# Patient Record
Sex: MALE | Race: WHITE | HISPANIC OR LATINO | Employment: FULL TIME | URBAN - METROPOLITAN AREA
[De-identification: names, ages, dates, MRNs, and addresses within clinical notes are randomized per-mention and may not be internally consistent; named-entity substitution may affect disease eponyms.]

---
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
## 2024-09-13 DIAGNOSIS — Z00.00 ANNUAL PHYSICAL EXAM: Primary | ICD-10-CM

## 2024-09-14 ENCOUNTER — LAB VISIT (OUTPATIENT)
Dept: LAB | Facility: HOSPITAL | Age: 19
End: 2024-09-14
Attending: STUDENT IN AN ORGANIZED HEALTH CARE EDUCATION/TRAINING PROGRAM
Payer: COMMERCIAL

## 2024-09-14 DIAGNOSIS — Z00.00 ANNUAL PHYSICAL EXAM: ICD-10-CM

## 2024-09-14 LAB
ALBUMIN SERPL BCP-MCNC: 4.2 G/DL (ref 3.5–5.2)
ALP SERPL-CCNC: 94 U/L (ref 55–135)
ALT SERPL W/O P-5'-P-CCNC: 19 U/L (ref 10–44)
ANION GAP SERPL CALC-SCNC: 7 MMOL/L (ref 8–16)
AST SERPL-CCNC: 20 U/L (ref 10–40)
BASOPHILS # BLD AUTO: 0.05 K/UL (ref 0–0.2)
BASOPHILS NFR BLD: 0.7 % (ref 0–1.9)
BILIRUB SERPL-MCNC: 1.7 MG/DL (ref 0.1–1)
BUN SERPL-MCNC: 11 MG/DL (ref 6–20)
CALCIUM SERPL-MCNC: 9.7 MG/DL (ref 8.7–10.5)
CHLORIDE SERPL-SCNC: 106 MMOL/L (ref 95–110)
CHOLEST SERPL-MCNC: 129 MG/DL (ref 120–199)
CHOLEST/HDLC SERPL: 3.8 {RATIO} (ref 2–5)
CO2 SERPL-SCNC: 26 MMOL/L (ref 23–29)
CREAT SERPL-MCNC: 0.8 MG/DL (ref 0.5–1.4)
DIFFERENTIAL METHOD BLD: NORMAL
EOSINOPHIL # BLD AUTO: 0.2 K/UL (ref 0–0.5)
EOSINOPHIL NFR BLD: 2.3 % (ref 0–8)
ERYTHROCYTE [DISTWIDTH] IN BLOOD BY AUTOMATED COUNT: 13 % (ref 11.5–14.5)
EST. GFR  (NO RACE VARIABLE): >60 ML/MIN/1.73 M^2
ESTIMATED AVG GLUCOSE: 97 MG/DL (ref 68–131)
GLUCOSE SERPL-MCNC: 85 MG/DL (ref 70–110)
HBA1C MFR BLD: 5 % (ref 4–5.6)
HCT VFR BLD AUTO: 46.2 % (ref 40–54)
HCV AB SERPL QL IA: NORMAL
HDLC SERPL-MCNC: 34 MG/DL (ref 40–75)
HDLC SERPL: 26.4 % (ref 20–50)
HGB BLD-MCNC: 15.4 G/DL (ref 14–18)
HIV 1+2 AB+HIV1 P24 AG SERPL QL IA: NORMAL
IMM GRANULOCYTES # BLD AUTO: 0.02 K/UL (ref 0–0.04)
IMM GRANULOCYTES NFR BLD AUTO: 0.3 % (ref 0–0.5)
LDLC SERPL CALC-MCNC: 78.8 MG/DL (ref 63–159)
LYMPHOCYTES # BLD AUTO: 2.2 K/UL (ref 1–4.8)
LYMPHOCYTES NFR BLD: 31.6 % (ref 18–48)
MCH RBC QN AUTO: 27.5 PG (ref 27–31)
MCHC RBC AUTO-ENTMCNC: 33.3 G/DL (ref 32–36)
MCV RBC AUTO: 82 FL (ref 82–98)
MONOCYTES # BLD AUTO: 0.6 K/UL (ref 0.3–1)
MONOCYTES NFR BLD: 8.7 % (ref 4–15)
NEUTROPHILS # BLD AUTO: 3.9 K/UL (ref 1.8–7.7)
NEUTROPHILS NFR BLD: 56.4 % (ref 38–73)
NONHDLC SERPL-MCNC: 95 MG/DL
NRBC BLD-RTO: 0 /100 WBC
PLATELET # BLD AUTO: 261 K/UL (ref 150–450)
PMV BLD AUTO: 10.2 FL (ref 9.2–12.9)
POTASSIUM SERPL-SCNC: 3.8 MMOL/L (ref 3.5–5.1)
PROT SERPL-MCNC: 7.4 G/DL (ref 6–8.4)
RBC # BLD AUTO: 5.61 M/UL (ref 4.6–6.2)
SODIUM SERPL-SCNC: 139 MMOL/L (ref 136–145)
TRIGL SERPL-MCNC: 81 MG/DL (ref 30–150)
WBC # BLD AUTO: 6.99 K/UL (ref 3.9–12.7)

## 2024-09-14 PROCEDURE — 80053 COMPREHEN METABOLIC PANEL: CPT | Performed by: STUDENT IN AN ORGANIZED HEALTH CARE EDUCATION/TRAINING PROGRAM

## 2024-09-14 PROCEDURE — 85025 COMPLETE CBC W/AUTO DIFF WBC: CPT | Performed by: STUDENT IN AN ORGANIZED HEALTH CARE EDUCATION/TRAINING PROGRAM

## 2024-09-14 PROCEDURE — 87389 HIV-1 AG W/HIV-1&-2 AB AG IA: CPT | Performed by: STUDENT IN AN ORGANIZED HEALTH CARE EDUCATION/TRAINING PROGRAM

## 2024-09-14 PROCEDURE — 86803 HEPATITIS C AB TEST: CPT | Performed by: STUDENT IN AN ORGANIZED HEALTH CARE EDUCATION/TRAINING PROGRAM

## 2024-09-14 PROCEDURE — 83036 HEMOGLOBIN GLYCOSYLATED A1C: CPT | Performed by: STUDENT IN AN ORGANIZED HEALTH CARE EDUCATION/TRAINING PROGRAM

## 2024-09-14 PROCEDURE — 80061 LIPID PANEL: CPT | Performed by: STUDENT IN AN ORGANIZED HEALTH CARE EDUCATION/TRAINING PROGRAM

## 2024-09-14 PROCEDURE — 36415 COLL VENOUS BLD VENIPUNCTURE: CPT | Performed by: STUDENT IN AN ORGANIZED HEALTH CARE EDUCATION/TRAINING PROGRAM

## 2024-09-16 ENCOUNTER — OFFICE VISIT (OUTPATIENT)
Dept: INTERNAL MEDICINE | Facility: CLINIC | Age: 19
End: 2024-09-16
Payer: COMMERCIAL

## 2024-09-16 VITALS
SYSTOLIC BLOOD PRESSURE: 112 MMHG | BODY MASS INDEX: 29.89 KG/M2 | DIASTOLIC BLOOD PRESSURE: 71 MMHG | HEART RATE: 62 BPM | WEIGHT: 175.06 LBS | HEIGHT: 64 IN

## 2024-09-16 DIAGNOSIS — R17 ELEVATED BILIRUBIN: ICD-10-CM

## 2024-09-16 DIAGNOSIS — E66.09 CLASS 1 OBESITY DUE TO EXCESS CALORIES WITHOUT SERIOUS COMORBIDITY WITH BODY MASS INDEX (BMI) OF 30.0 TO 30.9 IN ADULT: ICD-10-CM

## 2024-09-16 DIAGNOSIS — Z00.00 ANNUAL PHYSICAL EXAM: Primary | ICD-10-CM

## 2024-09-16 DIAGNOSIS — Z00.00 HEALTHCARE MAINTENANCE: ICD-10-CM

## 2024-09-16 DIAGNOSIS — Z70.9 SEXUAL COUNSELING: ICD-10-CM

## 2024-09-16 PROBLEM — E66.811 CLASS 1 OBESITY DUE TO EXCESS CALORIES WITHOUT SERIOUS COMORBIDITY WITH BODY MASS INDEX (BMI) OF 30.0 TO 30.9 IN ADULT: Status: ACTIVE | Noted: 2024-09-16

## 2024-09-16 PROCEDURE — 3074F SYST BP LT 130 MM HG: CPT | Mod: CPTII,S$GLB,, | Performed by: STUDENT IN AN ORGANIZED HEALTH CARE EDUCATION/TRAINING PROGRAM

## 2024-09-16 PROCEDURE — 3044F HG A1C LEVEL LT 7.0%: CPT | Mod: CPTII,S$GLB,, | Performed by: STUDENT IN AN ORGANIZED HEALTH CARE EDUCATION/TRAINING PROGRAM

## 2024-09-16 PROCEDURE — 3078F DIAST BP <80 MM HG: CPT | Mod: CPTII,S$GLB,, | Performed by: STUDENT IN AN ORGANIZED HEALTH CARE EDUCATION/TRAINING PROGRAM

## 2024-09-16 PROCEDURE — 3008F BODY MASS INDEX DOCD: CPT | Mod: CPTII,S$GLB,, | Performed by: STUDENT IN AN ORGANIZED HEALTH CARE EDUCATION/TRAINING PROGRAM

## 2024-09-16 PROCEDURE — 1160F RVW MEDS BY RX/DR IN RCRD: CPT | Mod: CPTII,S$GLB,, | Performed by: STUDENT IN AN ORGANIZED HEALTH CARE EDUCATION/TRAINING PROGRAM

## 2024-09-16 PROCEDURE — 99999 PR PBB SHADOW E&M-EST. PATIENT-LVL III: CPT | Mod: PBBFAC,,, | Performed by: STUDENT IN AN ORGANIZED HEALTH CARE EDUCATION/TRAINING PROGRAM

## 2024-09-16 PROCEDURE — 1159F MED LIST DOCD IN RCRD: CPT | Mod: CPTII,S$GLB,, | Performed by: STUDENT IN AN ORGANIZED HEALTH CARE EDUCATION/TRAINING PROGRAM

## 2024-09-16 PROCEDURE — 99385 PREV VISIT NEW AGE 18-39: CPT | Mod: S$GLB,,, | Performed by: STUDENT IN AN ORGANIZED HEALTH CARE EDUCATION/TRAINING PROGRAM

## 2024-09-16 RX ORDER — AZELASTINE 1 MG/ML
1 SPRAY, METERED NASAL
COMMUNITY
Start: 2024-02-09 | End: 2025-02-08

## 2024-09-16 NOTE — PROGRESS NOTES
"Subjective:       Patient ID: Mahad Conner is a 19 y.o. male.    Chief Complaint: Annual Exam (Physical because he is getting )    HPI    Mahad Conner is a 19 y.o. 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      [Local Patient]  Originally from [facility]  Lives in: [place] [address]       Patient comes to the clinic a general medical health examination and to establish care.    Patient's main concern is that he noticed a "pimple" in the bottom side of his penis, non-tender. Patient states this has been present for a few weeks and it is starting to "disappear". He says he has never had sexual intercourse.    He also wants counseling about having sexual intercourse, safe sex practices. He will be getting  this following weekend.    Patient denies CV symptoms, CP, SOB, palpitations.  Patient denies constitutional symptoms, fever, changes in the urine or stool.  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    Allergies:   Review of patient's allergies indicates:  No Known Allergies      Current Outpatient Medications:     azelastine (ASTELIN) 137 mcg (0.1 %) nasal spray, 1 spray by Nasal route., Disp: , Rfl:     Social history:  Single but will be getting  this weekend  Works for an [employer] company    Exercise:   Physically active at work    Diet:   Regular with no restrictions    Tobacco use: Denies    Tobacco Use: Low Risk  (9/16/2024)    Patient History     Smoking Tobacco Use: Never     Smokeless Tobacco Use: Never     Passive Exposure: Not on file        Alcohol use: Denies    Recreational drug use: Denies    Recent travel: Denies      Most recent laboratories " "reviewed:    Recent Labs   Lab 09/14/24  0806   WBC 6.99   Hemoglobin 15.4   Hematocrit 46.2   MCV 82   Platelets 261       Recent Labs   Lab 05/31/22  1110 09/14/24  0806   Glucose  --  85   Sodium 140 139   Potassium 4.2 3.8   BUN 8.0 11   Creatinine 0.70 0.8   Total Bilirubin 1.2 1.7 H   AST 47 20    H 19       Recent Labs   Lab 09/14/24  0806   Hemoglobin A1C 5.0       Recent Labs   Lab 09/14/24  0806   Cholesterol 129   Triglycerides 81   HDL 34 L   LDL Cholesterol 78.8   Non-HDL Cholesterol 95             Recent Labs   Lab 09/14/24  0806   HIV 1/2 Ag/Ab Non-reactive   Hepatitis C Ab Non-reactive         Latest ECG results:  No results found for this or any previous visit.    Healthcare Maintenance:  Colon cancer screening:       Vaccinations:        Tetanus - 2015       Shingles - no       Influenza - no       Pneumonia - no       COVID - completed x  6    Depression screening: PHQ2 score = 0    Annual visit approx. Month: SEPTEMBER ROS 11-point review of systems done. Negative except for detailed in the HPI.          Objective:      /71   Pulse 62   Ht 5' 4" (1.626 m)   Wt 79.4 kg (175 lb 0.7 oz)   BMI 30.05 kg/m²      Physical Exam   General appearance: Good general aspect, NAD, conversant   Chaperone present  Eyes and HEENT: Normal sclerae, moist mucous membranes, no thyromegaly or lymphadenopathy  Respiratory: No work of breathing, clear to auscultation bilaterally. No rales, rhonchi, wheezing, or rubs.  Cardiovascular: PMI not displaced. RRR. Normal S1, S2. No murmurs, rubs or gallops.  Abdomen and : Soft, non-tender, nondistended, BS, no hepatosplenomegaly, no masses.  External genitalia WNL, no masses observed, no lymphadenopathies, no discharge.  Extremities: no edemas, no extremity lymphadenopathy, no clubbing, no cyanosis.  Nervous System: Alert and oriented x 3, good concentration, no deficits.  Skin: Normal temperature, turgor and texture; no rash, ulcers or subcutaneous " nodules  Psych: Appropriate affect, alert and oriented to person, place and time          Assessment:       1. Annual physical exam  Assessment & Plan:  Patient is in overall good general health.  Stable medical conditions listed on the PMH.  Labs reviewed and patient notified.      Orders:  -     CBC Auto Differential; Future; Expected date: 09/06/2025  -     Comprehensive Metabolic Panel; Future; Expected date: 09/06/2025  -     Hemoglobin A1C; Future; Expected date: 09/06/2025    2. Class 1 obesity due to excess calories without serious comorbidity with body mass index (BMI) of 30.0 to 30.9 in adult  Assessment & Plan:  Today's weight: 79.4 kg (175 lb 0.7 oz)  Today's BMI: Body mass index is 30.05 kg/m².  Wt Readings from Last 3 Encounters:   09/16/24 79.4 kg (175 lb 0.7 oz)   12/24/18 65.8 kg (145 lb)     Physical activity and weight loss recommended          3. Elevated bilirubin  Assessment & Plan:  Lab Results   Component Value Date    ALT 19 09/14/2024    AST 20 09/14/2024    ALKPHOS 94 09/14/2024    BILITOT 1.7 (H) 09/14/2024       Asymptomatic  Likely Gilbert's. Will observe for now        4. Sexual counseling  Assessment & Plan:  Patient was counseled about safe sex practices and healthy sexual life, as per patient's request.      5. Healthcare maintenance  Assessment & Plan:  Health care maintenance and core gaps reviewed and assessed with patient.    Vaccinations recommended:        Tetanus - 2015       Shingles - n/a       Influenza - n/a       Pneumonia - n/a    Colon cancer screening:   n/a       Lifestyle recommendations given.  Physical activity recommended.    Legend: Ordered (O), Declined (D), Current (C)           Plan:       Counseling provided.      I will assume as PCP.          Patient Instructions   Try to get physically active           Problem list updated  Medications reconciled  Education provided  Lifestyle recommendations given  AVS generated, explained, and sent to the patient.  RTC  in : 1 year for annual wellness visit, labs ordered; CBC, CMP, A1C          LIDIA SPARROW MD, MPH  Internal Medicine  [facility]  [facility]

## 2024-09-16 NOTE — ASSESSMENT & PLAN NOTE
Today's weight: 79.4 kg (175 lb 0.7 oz)  Today's BMI: Body mass index is 30.05 kg/m².  Wt Readings from Last 3 Encounters:   09/16/24 79.4 kg (175 lb 0.7 oz)   12/24/18 65.8 kg (145 lb)     Physical activity and weight loss recommended

## 2024-09-16 NOTE — ASSESSMENT & PLAN NOTE
Health care maintenance and core gaps reviewed and assessed with patient.    Vaccinations recommended:        Tetanus - 2015       Shingles - n/a       Influenza - n/a       Pneumonia - n/a    Colon cancer screening:   n/a       Lifestyle recommendations given.  Physical activity recommended.    Legend: Ordered (O), Declined (D), Current (C)

## 2024-09-16 NOTE — ASSESSMENT & PLAN NOTE
Lab Results   Component Value Date    ALT 19 09/14/2024    AST 20 09/14/2024    ALKPHOS 94 09/14/2024    BILITOT 1.7 (H) 09/14/2024       Asymptomatic  Likely Gilbert's. Will observe for now

## 2025-01-13 ENCOUNTER — OFFICE VISIT (OUTPATIENT)
Dept: INTERNAL MEDICINE | Facility: CLINIC | Age: 20
End: 2025-01-13
Payer: COMMERCIAL

## 2025-01-13 ENCOUNTER — HOSPITAL ENCOUNTER (OUTPATIENT)
Dept: RADIOLOGY | Facility: HOSPITAL | Age: 20
Discharge: HOME | End: 2025-01-13
Attending: STUDENT IN AN ORGANIZED HEALTH CARE EDUCATION/TRAINING PROGRAM
Payer: COMMERCIAL

## 2025-01-13 VITALS
HEIGHT: 64 IN | DIASTOLIC BLOOD PRESSURE: 65 MMHG | SYSTOLIC BLOOD PRESSURE: 116 MMHG | BODY MASS INDEX: 31.09 KG/M2 | HEART RATE: 87 BPM | WEIGHT: 182.13 LBS

## 2025-01-13 DIAGNOSIS — M54.50 ACUTE MIDLINE LOW BACK PAIN WITHOUT SCIATICA: ICD-10-CM

## 2025-01-13 DIAGNOSIS — M54.50 ACUTE MIDLINE LOW BACK PAIN WITHOUT SCIATICA: Primary | ICD-10-CM

## 2025-01-13 PROCEDURE — 3074F SYST BP LT 130 MM HG: CPT | Mod: CPTII,S$GLB,, | Performed by: STUDENT IN AN ORGANIZED HEALTH CARE EDUCATION/TRAINING PROGRAM

## 2025-01-13 PROCEDURE — 1159F MED LIST DOCD IN RCRD: CPT | Mod: CPTII,S$GLB,, | Performed by: STUDENT IN AN ORGANIZED HEALTH CARE EDUCATION/TRAINING PROGRAM

## 2025-01-13 PROCEDURE — 72110 X-RAY EXAM L-2 SPINE 4/>VWS: CPT | Mod: 26,,, | Performed by: INTERNAL MEDICINE

## 2025-01-13 PROCEDURE — 99999 PR PBB SHADOW E&M-EST. PATIENT-LVL III: CPT | Mod: PBBFAC,,, | Performed by: STUDENT IN AN ORGANIZED HEALTH CARE EDUCATION/TRAINING PROGRAM

## 2025-01-13 PROCEDURE — 3078F DIAST BP <80 MM HG: CPT | Mod: CPTII,S$GLB,, | Performed by: STUDENT IN AN ORGANIZED HEALTH CARE EDUCATION/TRAINING PROGRAM

## 2025-01-13 PROCEDURE — 72110 X-RAY EXAM L-2 SPINE 4/>VWS: CPT | Mod: TC

## 2025-01-13 PROCEDURE — 1160F RVW MEDS BY RX/DR IN RCRD: CPT | Mod: CPTII,S$GLB,, | Performed by: STUDENT IN AN ORGANIZED HEALTH CARE EDUCATION/TRAINING PROGRAM

## 2025-01-13 PROCEDURE — 99213 OFFICE O/P EST LOW 20 MIN: CPT | Mod: S$GLB,,, | Performed by: STUDENT IN AN ORGANIZED HEALTH CARE EDUCATION/TRAINING PROGRAM

## 2025-01-13 PROCEDURE — 3008F BODY MASS INDEX DOCD: CPT | Mod: CPTII,S$GLB,, | Performed by: STUDENT IN AN ORGANIZED HEALTH CARE EDUCATION/TRAINING PROGRAM

## 2025-01-13 RX ORDER — METHOCARBAMOL 500 MG/1
500 TABLET, FILM COATED ORAL 3 TIMES DAILY PRN
Qty: 30 TABLET | Refills: 0 | Status: SHIPPED | OUTPATIENT
Start: 2025-01-13 | End: 2025-01-23

## 2025-01-13 NOTE — ASSESSMENT & PLAN NOTE
Acute, moderate in intensity, accompanied with muscle spasm.  I will treat with NSAIDs, methocarbamol. I have recommended massage therapy, rest for a couple of days.  If pain persists we will do PT  XR lumbar spine.

## 2025-01-13 NOTE — PROGRESS NOTES
Subjective:       Patient ID: Mahad Conner is a 19 y.o. male.    Chief Complaint: Back Pain    Back Pain        Mahad Conner is a 19 y.o. 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      [Local Patient]  Originally from [facility]  Lives in: [place] [address]       Patient comes to the clinic complaining of back pain    Patient reports he had very severe back pain, incapacitating this morning, when he woke up.  He had a hard time getting out of bed.    States he has had mild-to-moderate back pain for the past 5 days.  It all started after a very long day at work (more than 10 hours).  He works in a conditioning maintenance.    He was changing some sheathrock.    Patient states that he has had back pain, mild-to-moderate, on and off throughout his life since childhood.    Sometimes the back pain radiates to the right gluteal area.    He has been treated by chiropractor in the past with significant relief.      This morning patient took a leave with pain improvement and he was able to walk and come to my office today.      Patient denies trauma, falls, over stretching.    No other concerns    Past Medical History:   Diagnosis Date    Chronic right shoulder pain     Flat feet, bilateral     Refusal of blood transfusions as patient is Quaker        Past Surgical History:   Procedure Laterality Date    TOE SURGERY Right        No family history on file.    Allergies:   Review of patient's allergies indicates:  No Known Allergies      Current Outpatient Medications:     methocarbamoL (ROBAXIN) 500 MG Tab, Take 1 tablet (500 mg total) by mouth 3 (three) times daily as needed (back pain)., Disp: 30 tablet, Rfl: 0    Social history:  Single but will be getting  this weekend  Works for an [employer] company    Exercise:   Physically active at work    Diet:   Regular with no restrictions    Tobacco use: Denies    Tobacco Use:  "Low Risk  (1/13/2025)    Patient History     Smoking Tobacco Use: Never     Smokeless Tobacco Use: Never     Passive Exposure: Not on file        Alcohol use: Denies    Recreational drug use: Denies    Recent travel: Denies      Most recent laboratories reviewed:    Recent Labs   Lab 09/14/24  0806   WBC 6.99   Hemoglobin 15.4   Hematocrit 46.2   MCV 82   Platelets 261       Recent Labs   Lab 05/31/22  1110 09/14/24  0806   Glucose  --  85   Sodium 140 139   Potassium 4.2 3.8   BUN 8.0 11   Creatinine 0.70 0.8   Total Bilirubin 1.2 1.7 H   AST 47 20    H 19       Recent Labs   Lab 09/14/24  0806   Hemoglobin A1C 5.0       Recent Labs   Lab 09/14/24  0806   Cholesterol 129   Triglycerides 81   HDL 34 L   LDL Cholesterol 78.8   Non-HDL Cholesterol 95             Recent Labs   Lab 09/14/24  0806   HIV 1/2 Ag/Ab Non-reactive   Hepatitis C Ab Non-reactive         Latest ECG results:  No results found for this or any previous visit.    Healthcare Maintenance:  Colon cancer screening:       Vaccinations:        Tetanus - 2015       Shingles - no       Influenza - no       Pneumonia - no       COVID - completed x  6    Depression screening: PHQ2 score = 0    Annual visit approx. Month: SEPTEMBER ROS  11-point review of systems done. Negative except for detailed in the HPI.          Objective:      /65 (Patient Position: Sitting)   Pulse 87   Ht 5' 4" (1.626 m)   Wt 82.6 kg (182 lb 1.6 oz)   BMI 31.26 kg/m²      Physical Exam   General appearance: Good general aspect, NAD, conversant   Chaperone present  Eyes and HEENT: Normal sclerae, moist mucous membranes, no thyromegaly or lymphadenopathy  Respiratory: No work of breathing, clear to auscultation bilaterally. No rales, rhonchi, wheezing, or rubs.  Cardiovascular: PMI not displaced. RRR. Normal S1, S2. No murmurs, rubs or gallops.  Abdomen and : Soft, non-tender, nondistended, BS, no hepatosplenomegaly, no masses.  External genitalia WNL, no masses " observed, no lymphadenopathies, no discharge.  Extremities: no edemas, no extremity lymphadenopathy, no clubbing, no cyanosis.  Tenderness and stiffness of muscles of lumbar area.  Nervous System: Alert and oriented x 3, good concentration, no deficits.  Skin: Normal temperature, turgor and texture; no rash, ulcers or subcutaneous nodules  Psych: Appropriate affect, alert and oriented to person, place and time          Assessment:       1. Acute midline low back pain without sciatica  Assessment & Plan:  Acute, moderate in intensity, accompanied with muscle spasm.  I will treat with NSAIDs, methocarbamol. I have recommended massage therapy, rest for a couple of days.  If pain persists we will do PT  XR lumbar spine.    Orders:  -     X-Ray Lumbar Spine 5 View; Future; Expected date: 01/13/2025  -     methocarbamoL (ROBAXIN) 500 MG Tab; Take 1 tablet (500 mg total) by mouth 3 (three) times daily as needed (back pain).  Dispense: 30 tablet; Refill: 0       Plan:       NSAIDs  Methocarbamol  XR lumbar spine          Patient Instructions   Continue to take aleve 220mg twice daily for at least 2 days and then as needed thereafter.         Problem list updated  Medications reconciled  Education provided  Lifestyle recommendations given  AVS generated, explained, and sent to the patient.  RTC in :   September for annual wellness visit, labs ordered; CBC, CMP, A1C          LIDIA SPARROW MD, MPH  Internal Medicine  [facility]  [facility]

## 2025-01-16 ENCOUNTER — OFFICE VISIT (OUTPATIENT)
Dept: URGENT CARE | Facility: CLINIC | Age: 20
End: 2025-01-16
Payer: COMMERCIAL

## 2025-01-16 VITALS
BODY MASS INDEX: 31.07 KG/M2 | OXYGEN SATURATION: 98 % | DIASTOLIC BLOOD PRESSURE: 77 MMHG | WEIGHT: 182 LBS | HEIGHT: 64 IN | HEART RATE: 86 BPM | TEMPERATURE: 99 F | RESPIRATION RATE: 18 BRPM | SYSTOLIC BLOOD PRESSURE: 110 MMHG

## 2025-01-16 DIAGNOSIS — R05.9 COUGH, UNSPECIFIED TYPE: ICD-10-CM

## 2025-01-16 DIAGNOSIS — J00 ACUTE NASOPHARYNGITIS: Primary | ICD-10-CM

## 2025-01-16 LAB
CTP QC/QA: YES
CTP QC/QA: YES
POC MOLECULAR INFLUENZA A AGN: NEGATIVE
POC MOLECULAR INFLUENZA B AGN: NEGATIVE
SARS-COV-2 AG RESP QL IA.RAPID: NEGATIVE

## 2025-01-16 PROCEDURE — 87502 INFLUENZA DNA AMP PROBE: CPT | Mod: QW,S$GLB,,

## 2025-01-16 PROCEDURE — 99213 OFFICE O/P EST LOW 20 MIN: CPT | Mod: S$GLB,,,

## 2025-01-16 PROCEDURE — 87811 SARS-COV-2 COVID19 W/OPTIC: CPT | Mod: QW,S$GLB,,

## 2025-01-16 RX ORDER — FLUTICASONE PROPIONATE 50 MCG
2 SPRAY, SUSPENSION (ML) NASAL DAILY
Qty: 16 G | Refills: 0 | Status: SHIPPED | OUTPATIENT
Start: 2025-01-16

## 2025-01-16 RX ORDER — CETIRIZINE HYDROCHLORIDE 10 MG/1
10 TABLET ORAL DAILY
Qty: 30 TABLET | Refills: 0 | Status: SHIPPED | OUTPATIENT
Start: 2025-01-16

## 2025-01-16 NOTE — PROGRESS NOTES
"Subjective:      Patient ID: Mahad Conner is a 19 y.o. male.    Vitals:  height is 5' 4" (1.626 m) and weight is 82.6 kg (182 lb). His oral temperature is 98.5 °F (36.9 °C). His blood pressure is 110/77 and his pulse is 86. His respiration is 18 and oxygen saturation is 98%.     Chief Complaint: Cough    This is a 19 y.o. male with a chief complaint of cough.   Sx started yesterday and are worsening.   Sx include sore throat, productive mild cough, chest soreness, bodyaches, rhinorrhea, sinus congestion, PND,   Pt has taken OTC cold & flu medication for sx with no relief.     Provider note starts below:  Patient presents to clinic for evaluation of sore throat, postnasal drip, sinus pressure, rhinorrhea, nasal congestion, cough, and body aches which onset yesterday. Denies any known sick contacts. Took OTC cold and flu medication early this morning with no improvement of symptoms. Denies fever, chills, CP, SOB, wheezing, n/v/d, dizziness, lightheadedness. No other complaints.      Cough  This is a new problem. The current episode started yesterday. The problem has been gradually worsening. The cough is Productive of sputum. Associated symptoms include myalgias, nasal congestion, postnasal drip, rhinorrhea and a sore throat. Pertinent negatives include no chest pain, chills, ear pain, eye redness, fever, headaches, shortness of breath or wheezing. Treatments tried: OTC cold & flu medication. The treatment provided no relief.       Constitution: Negative for chills and fever.   HENT:  Positive for congestion, postnasal drip and sore throat. Negative for ear pain, ear discharge, sinus pain, sinus pressure, trouble swallowing and voice change.         +rhinorrhea   Neck: Negative for neck pain and neck stiffness.   Cardiovascular:  Negative for chest pain and palpitations.   Eyes:  Negative for eye pain and eye redness.   Respiratory:  Positive for cough. Negative for shortness of breath and wheezing.  "   Gastrointestinal:  Negative for nausea, vomiting and diarrhea.   Musculoskeletal:  Positive for muscle ache. Negative for muscle cramps.   Allergic/Immunologic: Negative for itching and sneezing.   Neurological:  Negative for dizziness, light-headedness, headaches, disorientation and altered mental status.   Psychiatric/Behavioral:  Negative for altered mental status, disorientation and confusion.       Objective:     Physical Exam   Constitutional: He is oriented to person, place, and time.  Non-toxic appearance. He does not appear ill. No distress.   HENT:   Head: Normocephalic and atraumatic.   Ears:   Right Ear: Tympanic membrane, external ear and ear canal normal.   Left Ear: Tympanic membrane, external ear and ear canal normal.   Nose: Rhinorrhea present.   Mouth/Throat: Mucous membranes are moist. Posterior oropharyngeal erythema present. No oropharyngeal exudate. Oropharynx is clear.   Eyes: Conjunctivae are normal. Extraocular movement intact   Neck: Neck supple.   Cardiovascular: Normal rate, regular rhythm, normal heart sounds and normal pulses.   Pulmonary/Chest: Effort normal and breath sounds normal. No stridor. No respiratory distress. He has no wheezes. He has no rhonchi. He has no rales.   Abdominal: Normal appearance.   Musculoskeletal: Normal range of motion.         General: Normal range of motion.   Neurological: He is alert, oriented to person, place, and time and at baseline.   Skin: Skin is warm and dry.   Psychiatric: His behavior is normal. Mood normal.   Nursing note and vitals reviewed.    Assessment:     Results for orders placed or performed in visit on 01/16/25   POCT Influenza A/B MOLECULAR    Collection Time: 01/16/25 10:02 AM   Result Value Ref Range    POC Molecular Influenza A Ag Negative Negative    POC Molecular Influenza B Ag Negative Negative     Acceptable Yes    SARS Coronavirus 2 Antigen, POCT Manual Read    Collection Time: 01/16/25 10:03 AM   Result Value  Ref Range    SARS Coronavirus 2 Antigen Negative Negative     Acceptable Yes        1. Acute nasopharyngitis    2. Cough, unspecified type        Plan:     Acute nasopharyngitis  -     cetirizine (ZYRTEC) 10 MG tablet; Take 1 tablet (10 mg total) by mouth once daily.  Dispense: 30 tablet; Refill: 0  -     fluticasone propionate (FLONASE) 50 mcg/actuation nasal spray; 2 sprays (100 mcg total) by Each Nostril route once daily.  Dispense: 16 g; Refill: 0    Cough, unspecified type  -     SARS Coronavirus 2 Antigen, POCT Manual Read  -     POCT Influenza A/B MOLECULAR            Patient Instructions   Acute Nasopharyngitis  Treatment  Recommend oral antihistamine once daily in the morning to help with symptoms. I have sent Zyrtec (cetirizine) 10 mg to pharmacy, if not covered it is available over the counter.   Recommend oral decongestants such as Sudafed (pseudoephedrine) for sinus pressure. You can purchase behind the counter at drug stores if needed.  Sudafed is not recommended in those who have elevated blood pressure or heart palpitations. Take <10 days. These ingredients can keep you up all night, decrease appetite, feel jittery, and raise blood pressure with long term use. Once symptoms improve, proceed with Claritin/Zyrtec/Allegra once a day.  Intranasal steroid spray (Flonase) to reduce swelling and inflammation of the nose and sinuses  Guaifenesin (Mucinex) to help thin the mucus  Tylenol (Acetaminophen) 650 mg to 1 g every 6 hours and/or Motrin (Ibuprofen) 600 to 800 mg every 6 hours as needed for pain and/or fever    Care at home  Nasal saline spray or nasal sinus rinses, such as Neilmed or Netti Pot, which you can  at your local drugstore. Please do this twice a day while having symptoms and then as needed.  Steam tenting  Please drink plenty of fluids.  Please get plenty of rest.  If you smoke, please stop smoking.  To help ease a sore throat, you can:  Use a sore throat spray.  Suck  on hard candy or throat lozenges.  Gargle with warm saltwater a few times each day. Mix of 1/4 teaspoon (1.25 grams) salt in 8 ounces (240 mL) of warm water.  Use a cool mist humidifier to help you breathe easier.    Please remember that you have received care at an urgent care today. Urgent cares are not emergency rooms and are not equipped to handle life threatening emergencies and cannot rule in or out certain medical conditions and you may be released before all of your medical problems are known or treated.     Please arrange follow up with your primary care physician or speciality clinic within 2-5 days if your signs and symptoms have not resolved or worsen.     Patient can call our Referral Hotline at [phone] to make an appointment.    Please return here or go to the Emergency Department for any concerns or worsening of condition.  Signs of infection. These include a fever of 100.4°F (38°C) or higher, chills, cough, more sputum or change in color of sputum.  You are having so much trouble breathing that you can only say one or two words at a time.  You need to sit upright at all times to be able to breathe and or cannot lie down.  You have trouble breathing when talking or sitting still.  You have a fever of 100.4°F (38°C) or higher or chills.  You have chest pain when you cough, have trouble breathing but can still talk in full sentences, or cough up blood.

## 2025-01-16 NOTE — PATIENT INSTRUCTIONS
Acute Nasopharyngitis  Treatment  Recommend oral antihistamine once daily in the morning to help with symptoms. I have sent Zyrtec (cetirizine) 10 mg to pharmacy, if not covered it is available over the counter.   Recommend oral decongestants such as Sudafed (pseudoephedrine) for sinus pressure. You can purchase behind the counter at drug stores if needed.  Sudafed is not recommended in those who have elevated blood pressure or heart palpitations. Take <10 days. These ingredients can keep you up all night, decrease appetite, feel jittery, and raise blood pressure with long term use. Once symptoms improve, proceed with Claritin/Zyrtec/Allegra once a day.  Intranasal steroid spray (Flonase) to reduce swelling and inflammation of the nose and sinuses  Guaifenesin (Mucinex) to help thin the mucus  Tylenol (Acetaminophen) 650 mg to 1 g every 6 hours and/or Motrin (Ibuprofen) 600 to 800 mg every 6 hours as needed for pain and/or fever    Care at home  Nasal saline spray or nasal sinus rinses, such as Neilmed or Netti Pot, which you can  at your local drugstore. Please do this twice a day while having symptoms and then as needed.  Steam tenting  Please drink plenty of fluids.  Please get plenty of rest.  If you smoke, please stop smoking.  To help ease a sore throat, you can:  Use a sore throat spray.  Suck on hard candy or throat lozenges.  Gargle with warm saltwater a few times each day. Mix of 1/4 teaspoon (1.25 grams) salt in 8 ounces (240 mL) of warm water.  Use a cool mist humidifier to help you breathe easier.    Please remember that you have received care at an urgent care today. Urgent cares are not emergency rooms and are not equipped to handle life threatening emergencies and cannot rule in or out certain medical conditions and you may be released before all of your medical problems are known or treated.     Please arrange follow up with your primary care physician or speciality clinic within 2-5 days if  your signs and symptoms have not resolved or worsen.     Patient can call our Referral Hotline at [phone] to make an appointment.    Please return here or go to the Emergency Department for any concerns or worsening of condition.  Signs of infection. These include a fever of 100.4°F (38°C) or higher, chills, cough, more sputum or change in color of sputum.  You are having so much trouble breathing that you can only say one or two words at a time.  You need to sit upright at all times to be able to breathe and or cannot lie down.  You have trouble breathing when talking or sitting still.  You have a fever of 100.4°F (38°C) or higher or chills.  You have chest pain when you cough, have trouble breathing but can still talk in full sentences, or cough up blood.

## 2025-01-16 NOTE — LETTER
January 16, 2025      [facility] [address]  [place] [id number]  Phone: [phone]  Fax: [phone]       Patient: Mahad Conner   YOB: 2005  Date of Visit: 01/16/2025    To Whom It May Concern:    MELANIA Conner  was at [facility] on 01/16/2025. The patient may return to work/school on 1/17/2025 with no restrictions. If you have any questions or concerns, or if I can be of further assistance, please do not hesitate to contact me.    Sincerely,      Valentina Sanders PA-C

## 2025-02-19 NOTE — PLAN OF CARE
[facility]  Physical Therapy Initial Evaluation    Date: 10/26/2020   Name: Allison Conner  Clinic Number: [id number]    Therapy Diagnosis:   Encounter Diagnoses   Name Primary?    Decreased range of motion of thoracic spine     Scapular dysfunction     Thoracic spine pain      Physician: Cris Lozano NP  Physician Orders: PT Eval and Treat   Medical Diagnosis from Referral: M54.6 (ICD-10-CM) - Pain in thoracic spine  Evaluation Date: 10/26/2020  Authorization Period Expiration: 12/31/2020  Plan of Care Expiration: 12/11/2020  Visit # / Visits authorized: 1/50    Time In: 3:55 pm  Time Out: 4:30 pm  Total Appointment Time (timed & untimed codes): 35 minutes (1 LCE) (1 TE)    Precautions: Standard    Subjective   Date of onset: ~ 3 years of thoracic pain  History of current condition - ALLISON reports: he has been having upper back pain for approximately 3 years. Patient reports he does not remember a specific mechanism of injury. He does not have any neck pain at this time and all of his pain is located in shoulder/scapula and thoracic spine. He just started working out about 2 months ago and believes this has made his upper back pain a bit worse. Aggravating factors for his upper back running, prolonged sitting, and standing. Easing factors include laying down on his back and stretching his upper back.      Medical History:   No past medical history on file.    Surgical History:   Allison Conner  has no past surgical history on file.    Medications:   Allison has a current medication list which includes the following prescription(s): ibuprofen.    Allergies:   Review of patient's allergies indicates:  No Known Allergies     Imaging: N/A    Prior Therapy: No   Social History: lives with parents   Occupation: No   Prior Level of Function: 100%  Current Level of Function: 100% but with pain    Pain:  Current 7/10, worst 8/10, best 5/10   Location: right sided thoracic pain  Description:  Aching, Dull, Grabbing and Tight  Aggravating Factors: running, prolonged sitting, and standing  Easing Factors: aying down on his back and stretching his upper back    Pt's goals:   1. Return to working out and running without pain.    Objective   Observation:    Rounded shoulders   Slumped sitting posture    (R) scapular dyskinesia with (R) UE flexion/abduction       Posture:   Protracted cervical spine/forward head posture  Protracted shoulder girdle and rounded shoulders  Upper Thoracic Spine: flat back       Cervical Range of Motion:    Degrees Pain   Flexion  WNL  No pain     Extension  WNL  No pain     Right Rotation  WNL  No pain      Left Rotation  WNL  No pain      Right Side Bending  WNL   No pain    Left Side Bending  WNL   No pain      Shoulder Range of Motion:   Shoulder Left Right   Flexion  WNL  WNL   Abduction  WNL   WNL    ER  WNL   WNL   IR  WNL   WNL       Upper Extremity Strength  (R) UE  (L) UE    Shoulder flexion: 5/5 Shoulder flexion: 5/5   Shoulder Abduction: 5/5 Shoulder abduction: 5/5   Shoulder ER 4/5 Shoulder ER 4/5   Lower Trap 3+/5 Lower Trap 3+/5   Middle Trap 4-/5 Middle Trap 4-/5   Rhomboids 4-/5 Rhomboids 4-/5     Joint Mobility:   Thoracic:    - T1-T6: hypomobile P/A   - T1-T6 hypomobile up/down glides with cavitation noted  (B) Shoulders: hypomobile in P/A direction     Palpation:   (R) medial border of scapula TTP+   (R) subscapularis TTP+     Sensation: WNL    Flexibility:    Upper Trap tightness   Levator Scapulae tightness   Pec major/minor tightness   Latissimus dorsi tightness        Limitation/Restriction for FOTO Cervical Survey    Therapist reviewed FOTO scores for Allison Conner on 10/26/2020.   FOTO documents entered into [employer] - see Media section.    Limitation Score: NEXT VISIT          TREATMENT   Treatment Time In: 4:15 pm  Treatment Time Out: 4:30 pm  Total Treatment time (time-based codes) separate from Evaluation: 15 minutes    ALLISON received therapeutic exercises  "to develop strength, endurance, ROM, flexibility and posture for 10 minutes including:   Prone shoulder EXT lift off    2 x 10   Thread the needle stretch    5 x 10"   Lat stretch/thoracic EXT    5 x 10"    ALLISON received the following manual therapy techniques: Joint mobilizations and Soft tissue Mobilization were applied to the: thoracic spine for 5 minutes, including:   Grade V T4-T5 up/down glide with cavitation    Grade III-IV T1-T6 P/As         Home Exercises and Patient Education Provided    Education provided:   - Home Exercise Program    Written Home Exercises Provided: yes.  Exercises were reviewed and ALLISON was able to demonstrate them prior to the end of the session.  ALLISON demonstrated good  understanding of the education provided.     See EMR under Media for exercises provided 10/26/2020.    Assessment   Allison is a 15 y.o. male referred to outpatient Physical Therapy with a medical diagnosis of thoracic spine pain. Patient's signs and symptoms are consistent with (R) sided scapular dyskinesia and periscapular weakness leading to shoulder instability/thoracic pain. Pt presents with limited and/or painful thoracic AROM along with serratus anterior, lower trap, middle trap, rhomboid weakness, tenderness/tightness of UT, levator scap, pec major/minor, and posterior shoulder capsules, and functional limitations of prolonged positions, working out, and running. Patient would benefit from skilled PT to regain pain free thoracic ROM along with periscapular strengthening.    Pt to be seen 2x/week for 6 weeks     Pt prognosis is Excellent.   Pt will benefit from skilled outpatient Physical Therapy to address the deficits stated above and in the chart below, provide pt/family education, and to maximize pt's level of independence.     Plan of care discussed with patient: Yes  Pt's spiritual, cultural and educational needs considered and patient is agreeable to the plan of care and goals as stated below: "     Anticipated Barriers for therapy: Young age     Medical Necessity is demonstrated by the following  History  Co-morbidities and personal factors that may impact the plan of care Co-morbidities:   young age    Personal Factors:   no deficits     low   Examination  Body Structures and Functions, activity limitations and participation restrictions that may impact the plan of care Body Regions:   back  upper extremities  trunk    Body Systems:    gross symmetry  ROM  strength  gross coordinated movement  transfers  transitions  motor control  motor learning    Participation Restrictions:   Running   Working out   Prolonged positions     Activity limitations:   Learning and applying knowledge  no deficits    General Tasks and Commands  no deficits    Communication  no deficits    Mobility  no deficits    Self care  no deficits    Domestic Life  assisting others    Interactions/Relationships  no deficits    Life Areas  no deficits    Community and Social Life  no deficits         high   Clinical Presentation stable and uncomplicated low   Decision Making/ Complexity Score: low     Goals:  Short Term Goals (3 Weeks):   1. Pt will be independent with HEP to supplement PT in improving pain free cervical mobility  2. Pt will improve thoracic AROM to be pain free and WNL in all planes to improve mobility for ADL's.  3. Pt will improve UE MMTs by 1/2 grade in all planes to improve strength for lifting and carrying tasks.  4. Pt will demonstrate improved sitting posture to decrease pain experienced in head and neck.    Long Term Goals (6 Weeks):   1. Pt will improve FOTO to </=% limitation to improve perceived limitation with changing and maintaining mobility.  2. Pt will improve UE MMTs 1 grade in all planes to improve strength for lifting and carrying tasks.  3. Pt will report no pain with lifting 15 lbs to promote physical activity.       Plan   Plan of care Certification: 10/26/2020 to 12/11/2020.    Outpatient Physical  Therapy 2 times weekly for 6 weeks to include the following interventions: Manual Therapy, Moist Heat/ Ice, Neuromuscular Re-ed, Patient Education, Self Care, Therapeutic Activites and Therapeutic Exercise.     Theodore Domínguez, PT       stable

## 2025-08-30 ENCOUNTER — OFFICE VISIT (OUTPATIENT)
Dept: URGENT CARE | Facility: CLINIC | Age: 20
End: 2025-08-30
Payer: COMMERCIAL